# Patient Record
Sex: FEMALE | Race: WHITE | NOT HISPANIC OR LATINO | Employment: UNEMPLOYED | URBAN - NONMETROPOLITAN AREA
[De-identification: names, ages, dates, MRNs, and addresses within clinical notes are randomized per-mention and may not be internally consistent; named-entity substitution may affect disease eponyms.]

---

## 2022-10-15 ENCOUNTER — HOSPITAL ENCOUNTER (EMERGENCY)
Facility: HOSPITAL | Age: 32
Discharge: HOME | End: 2022-10-15
Attending: STUDENT IN AN ORGANIZED HEALTH CARE EDUCATION/TRAINING PROGRAM
Payer: COMMERCIAL

## 2022-10-15 VITALS
HEIGHT: 62 IN | BODY MASS INDEX: 39.56 KG/M2 | OXYGEN SATURATION: 99 % | DIASTOLIC BLOOD PRESSURE: 66 MMHG | SYSTOLIC BLOOD PRESSURE: 142 MMHG | RESPIRATION RATE: 20 BRPM | HEART RATE: 124 BPM | TEMPERATURE: 99 F | WEIGHT: 215 LBS

## 2022-10-15 DIAGNOSIS — O21.9 NAUSEA AND VOMITING IN PREGNANCY: Primary | ICD-10-CM

## 2022-10-15 LAB
ALBUMIN SERPL BCP-MCNC: 3.3 G/DL (ref 3.5–5.2)
ALP SERPL-CCNC: 62 U/L (ref 55–135)
ALT SERPL W/O P-5'-P-CCNC: 20 U/L (ref 10–44)
ANION GAP SERPL CALC-SCNC: 10 MMOL/L (ref 8–16)
AST SERPL-CCNC: 12 U/L (ref 10–40)
BASOPHILS # BLD AUTO: 0.01 K/UL (ref 0–0.2)
BASOPHILS NFR BLD: 0.1 % (ref 0–1.9)
BILIRUB SERPL-MCNC: 0.5 MG/DL (ref 0.1–1)
BUN SERPL-MCNC: 10 MG/DL (ref 6–20)
CALCIUM SERPL-MCNC: 8.2 MG/DL (ref 8.7–10.5)
CHLORIDE SERPL-SCNC: 105 MMOL/L (ref 95–110)
CO2 SERPL-SCNC: 23 MMOL/L (ref 23–29)
CREAT SERPL-MCNC: 0.8 MG/DL (ref 0.5–1.4)
DIFFERENTIAL METHOD: ABNORMAL
EOSINOPHIL # BLD AUTO: 0 K/UL (ref 0–0.5)
EOSINOPHIL NFR BLD: 0.4 % (ref 0–8)
ERYTHROCYTE [DISTWIDTH] IN BLOOD BY AUTOMATED COUNT: 13 % (ref 11.5–14.5)
EST. GFR  (NO RACE VARIABLE): >60 ML/MIN/1.73 M^2
GLUCOSE SERPL-MCNC: 163 MG/DL (ref 70–110)
HCT VFR BLD AUTO: 38.3 % (ref 37–48.5)
HGB BLD-MCNC: 12.9 G/DL (ref 12–16)
IMM GRANULOCYTES # BLD AUTO: 0.07 K/UL (ref 0–0.04)
IMM GRANULOCYTES NFR BLD AUTO: 0.7 % (ref 0–0.5)
INFLUENZA A, MOLECULAR: NEGATIVE
INFLUENZA B, MOLECULAR: NEGATIVE
LYMPHOCYTES # BLD AUTO: 0.7 K/UL (ref 1–4.8)
LYMPHOCYTES NFR BLD: 7 % (ref 18–48)
MCH RBC QN AUTO: 28.9 PG (ref 27–31)
MCHC RBC AUTO-ENTMCNC: 33.7 G/DL (ref 32–36)
MCV RBC AUTO: 86 FL (ref 82–98)
MONOCYTES # BLD AUTO: 0.3 K/UL (ref 0.3–1)
MONOCYTES NFR BLD: 3.1 % (ref 4–15)
NEUTROPHILS # BLD AUTO: 8.3 K/UL (ref 1.8–7.7)
NEUTROPHILS NFR BLD: 88.7 % (ref 38–73)
NRBC BLD-RTO: 0 /100 WBC
PLATELET # BLD AUTO: 230 K/UL (ref 150–450)
PMV BLD AUTO: 10.5 FL (ref 9.2–12.9)
POTASSIUM SERPL-SCNC: 3.3 MMOL/L (ref 3.5–5.1)
PROT SERPL-MCNC: 7.5 G/DL (ref 6–8.4)
RBC # BLD AUTO: 4.47 M/UL (ref 4–5.4)
SARS-COV-2 RNA RESP QL NAA+PROBE: NOT DETECTED
SODIUM SERPL-SCNC: 138 MMOL/L (ref 136–145)
SPECIMEN SOURCE: NORMAL
WBC # BLD AUTO: 9.39 K/UL (ref 3.9–12.7)

## 2022-10-15 PROCEDURE — 96374 THER/PROPH/DIAG INJ IV PUSH: CPT

## 2022-10-15 PROCEDURE — 63600175 PHARM REV CODE 636 W HCPCS: Performed by: STUDENT IN AN ORGANIZED HEALTH CARE EDUCATION/TRAINING PROGRAM

## 2022-10-15 PROCEDURE — 85025 COMPLETE CBC W/AUTO DIFF WBC: CPT | Performed by: STUDENT IN AN ORGANIZED HEALTH CARE EDUCATION/TRAINING PROGRAM

## 2022-10-15 PROCEDURE — 99284 EMERGENCY DEPT VISIT MOD MDM: CPT | Mod: 25

## 2022-10-15 PROCEDURE — 96361 HYDRATE IV INFUSION ADD-ON: CPT

## 2022-10-15 PROCEDURE — 25000003 PHARM REV CODE 250: Performed by: STUDENT IN AN ORGANIZED HEALTH CARE EDUCATION/TRAINING PROGRAM

## 2022-10-15 PROCEDURE — 80053 COMPREHEN METABOLIC PANEL: CPT | Performed by: STUDENT IN AN ORGANIZED HEALTH CARE EDUCATION/TRAINING PROGRAM

## 2022-10-15 PROCEDURE — U0002 COVID-19 LAB TEST NON-CDC: HCPCS | Performed by: STUDENT IN AN ORGANIZED HEALTH CARE EDUCATION/TRAINING PROGRAM

## 2022-10-15 PROCEDURE — 87502 INFLUENZA DNA AMP PROBE: CPT | Performed by: STUDENT IN AN ORGANIZED HEALTH CARE EDUCATION/TRAINING PROGRAM

## 2022-10-15 RX ORDER — ACETAMINOPHEN 500 MG
1000 TABLET ORAL
Status: COMPLETED | OUTPATIENT
Start: 2022-10-15 | End: 2022-10-15

## 2022-10-15 RX ORDER — DOXYLAMINE SUCCINATE AND PYRIDOXINE HYDROCHLORIDE, DELAYED RELEASE TABLETS 10 MG/10 MG 10; 10 MG/1; MG/1
TABLET, DELAYED RELEASE ORAL
Qty: 30 TABLET | Refills: 0 | Status: SHIPPED | OUTPATIENT
Start: 2022-10-15 | End: 2022-10-22

## 2022-10-15 RX ORDER — METOCLOPRAMIDE HYDROCHLORIDE 5 MG/ML
10 INJECTION INTRAMUSCULAR; INTRAVENOUS
Status: COMPLETED | OUTPATIENT
Start: 2022-10-15 | End: 2022-10-15

## 2022-10-15 RX ADMIN — ACETAMINOPHEN 1000 MG: 500 TABLET ORAL at 01:10

## 2022-10-15 RX ADMIN — METOCLOPRAMIDE 10 MG: 5 INJECTION, SOLUTION INTRAMUSCULAR; INTRAVENOUS at 01:10

## 2022-10-15 RX ADMIN — SODIUM CHLORIDE 1000 ML: 0.9 INJECTION, SOLUTION INTRAVENOUS at 01:10

## 2022-10-15 NOTE — ED PROVIDER NOTES
"  History  Chief Complaint   Patient presents with    Emesis During Pregnancy     Patient reports vomiting since 3pm. She is 5 weeks pregnant.  Pt also notes RUQ abdominal cramping after vomiting began.     32-year-old female, currently 5 weeks pregnant who presents due to vomiting.  Symptoms were acute in onset this afternoon around 3:00 p.m..  Patient reports multiple episodes of emesis.  Associated symptoms include abdominal cramping, symptoms seem to be concentrated on the right side of the stomach.  Patient did note that her daughter was sick with a GI bug recently.  No other sick contacts reported.  No medication taken in the outpatient setting for symptom relief.  No specific exacerbating or alleviating      No past medical history on file.    No past surgical history on file.    No family history on file.         ROS  Review of Systems   Constitutional:  Negative for fever.   HENT:  Negative for congestion.    Eyes:  Negative for visual disturbance.   Respiratory:  Negative for cough and shortness of breath.    Cardiovascular:  Negative for chest pain.   Gastrointestinal:  Positive for abdominal pain, nausea and vomiting.   Genitourinary:  Negative for dysuria.   Musculoskeletal:  Negative for arthralgias.   Skin:  Negative for color change.   Allergic/Immunologic: Negative for immunocompromised state.   Neurological:  Negative for headaches.   Hematological:  Negative for adenopathy. Does not bruise/bleed easily.     Physical Exam  BP (!) 142/66   Pulse (!) 124   Temp 99.2 °F (37.3 °C) (Oral)   Resp 20   Ht 5' 2" (1.575 m)   Wt 97.5 kg (215 lb)   SpO2 99%   BMI 39.32 kg/m²   Physical Exam    Constitutional: She appears well-developed and well-nourished. She is cooperative.   HENT:   Head: Normocephalic and atraumatic.   Eyes: Conjunctivae, EOM and lids are normal. Pupils are equal, round, and reactive to light.   Neck: Phonation normal.   Normal range of motion.  Cardiovascular:  Normal rate, regular " rhythm and intact distal pulses.           Pulmonary/Chest: Breath sounds normal. No stridor. No respiratory distress.   Abdominal: Abdomen is soft. There is no abdominal tenderness.   Musculoskeletal:         General: No tenderness. Normal range of motion.      Cervical back: Normal range of motion.     Neurological: She is alert and oriented to person, place, and time.   Skin: Skin is warm and dry.   Psychiatric: She has a normal mood and affect. Her speech is normal and behavior is normal.             Labs Reviewed   CBC W/ AUTO DIFFERENTIAL - Abnormal; Notable for the following components:       Result Value    Immature Granulocytes 0.7 (*)     Gran # (ANC) 8.3 (*)     Immature Grans (Abs) 0.07 (*)     Lymph # 0.7 (*)     Gran % 88.7 (*)     Lymph % 7.0 (*)     Mono % 3.1 (*)     All other components within normal limits   COMPREHENSIVE METABOLIC PANEL - Abnormal; Notable for the following components:    Potassium 3.3 (*)     Glucose 163 (*)     Calcium 8.2 (*)     Albumin 3.3 (*)     All other components within normal limits   INFLUENZA A & B BY MOLECULAR   SARS-COV-2 (COVID-19) QUALITATIVE PCR    Narrative:     Is the patient symptomatic?->No  Is this needed for pre-procedure or pre-op testing?->No                          Procedures    ED Course as of 10/15/22 0250   Sat Oct 15, 2022   0242 SARS-CoV2 (COVID-19) Qualitative PCR: Not Detected [NA]   0242 Influenza A, Molecular: Negative [NA]   0242 Influenza B, Molecular: Negative [NA]   0242 All labs reviewed and grossly unremarkable.  Fluids and antiemetics given with symptom improvement.  Will give prescriptions for the outpatient setting.  Symptoms possibly related to GI bug versus hyperemesis. Advised pt to f/u with OB/GYN for re-evaluation and further care.  [NA]      ED Course User Index  [NA] Keira Amezquita MD            MDM        Clinical Impression  The encounter diagnosis was Nausea and vomiting in pregnancy.       Keira Amezquita MD  10/15/22  [id number]